# Patient Record
Sex: FEMALE | Race: WHITE
[De-identification: names, ages, dates, MRNs, and addresses within clinical notes are randomized per-mention and may not be internally consistent; named-entity substitution may affect disease eponyms.]

---

## 2021-11-28 ENCOUNTER — HOSPITAL ENCOUNTER (EMERGENCY)
Dept: HOSPITAL 75 - ER FS | Age: 26
Discharge: HOME | End: 2021-11-28
Payer: SELF-PAY

## 2021-11-28 VITALS — WEIGHT: 247.8 LBS | BODY MASS INDEX: 43.91 KG/M2 | HEIGHT: 62.99 IN

## 2021-11-28 VITALS — DIASTOLIC BLOOD PRESSURE: 78 MMHG | SYSTOLIC BLOOD PRESSURE: 130 MMHG

## 2021-11-28 DIAGNOSIS — K29.20: Primary | ICD-10-CM

## 2021-11-28 DIAGNOSIS — F17.210: ICD-10-CM

## 2021-11-28 LAB
ALBUMIN SERPL-MCNC: 4.3 GM/DL (ref 3.2–4.5)
ALP SERPL-CCNC: 143 U/L (ref 40–136)
ALT SERPL-CCNC: 26 U/L (ref 0–55)
APTT PPP: YELLOW S
BACTERIA #/AREA URNS HPF: (no result) /HPF
BASOPHILS # BLD AUTO: 0.1 10^3/UL (ref 0–0.1)
BASOPHILS NFR BLD AUTO: 1 % (ref 0–10)
BILIRUB SERPL-MCNC: 0.2 MG/DL (ref 0.1–1)
BILIRUB UR QL STRIP: NEGATIVE
BUN/CREAT SERPL: 15
CALCIUM SERPL-MCNC: 9.2 MG/DL (ref 8.5–10.1)
CHLORIDE SERPL-SCNC: 107 MMOL/L (ref 98–107)
CO2 SERPL-SCNC: 25 MMOL/L (ref 21–32)
CREAT SERPL-MCNC: 0.71 MG/DL (ref 0.6–1.3)
EOSINOPHIL # BLD AUTO: 0.2 10^3/UL (ref 0–0.3)
EOSINOPHIL NFR BLD AUTO: 1 % (ref 0–10)
FIBRINOGEN PPP-MCNC: CLEAR MG/DL
GFR SERPLBLD BASED ON 1.73 SQ M-ARVRAT: 99 ML/MIN
GLUCOSE SERPL-MCNC: 102 MG/DL (ref 70–105)
GLUCOSE UR STRIP-MCNC: NEGATIVE MG/DL
HCT VFR BLD CALC: 44 % (ref 35–52)
HGB BLD-MCNC: 14.5 G/DL (ref 11.5–16)
KETONES UR QL STRIP: NEGATIVE
LEUKOCYTE ESTERASE UR QL STRIP: (no result)
LIPASE SERPL-CCNC: 67 U/L (ref 8–78)
LYMPHOCYTES # BLD AUTO: 1.5 X 10^3 (ref 1–4)
LYMPHOCYTES NFR BLD AUTO: 14 % (ref 12–44)
MANUAL DIFFERENTIAL PERFORMED BLD QL: NO
MCH RBC QN AUTO: 28 PG (ref 25–34)
MCHC RBC AUTO-ENTMCNC: 33 G/DL (ref 32–36)
MCV RBC AUTO: 84 FL (ref 80–99)
MONOCYTES # BLD AUTO: 0.5 X 10^3 (ref 0–1)
MONOCYTES NFR BLD AUTO: 4 % (ref 0–12)
NEUTROPHILS # BLD AUTO: 8.7 X 10^3 (ref 1.8–7.8)
NEUTROPHILS NFR BLD AUTO: 80 % (ref 42–75)
NITRITE UR QL STRIP: NEGATIVE
PH UR STRIP: 8.5 [PH] (ref 5–9)
PLATELET # BLD: 399 10^3/UL (ref 130–400)
PMV BLD AUTO: 11.5 FL (ref 9–12.2)
POTASSIUM SERPL-SCNC: 4.6 MMOL/L (ref 3.6–5)
PROT SERPL-MCNC: 7.8 GM/DL (ref 6.4–8.2)
PROT UR QL STRIP: (no result)
RBC #/AREA URNS HPF: (no result) /HPF
SODIUM SERPL-SCNC: 143 MMOL/L (ref 135–145)
SP GR UR STRIP: 1.01 (ref 1.02–1.02)
SQUAMOUS #/AREA URNS HPF: (no result) /HPF
WBC # BLD AUTO: 10.9 10^3/UL (ref 4.3–11)
WBC #/AREA URNS HPF: (no result) /HPF

## 2021-11-28 PROCEDURE — 81000 URINALYSIS NONAUTO W/SCOPE: CPT

## 2021-11-28 PROCEDURE — 80053 COMPREHEN METABOLIC PANEL: CPT

## 2021-11-28 PROCEDURE — 36415 COLL VENOUS BLD VENIPUNCTURE: CPT

## 2021-11-28 PROCEDURE — 85025 COMPLETE CBC W/AUTO DIFF WBC: CPT

## 2021-11-28 PROCEDURE — 87088 URINE BACTERIA CULTURE: CPT

## 2021-11-28 PROCEDURE — 84703 CHORIONIC GONADOTROPIN ASSAY: CPT

## 2021-11-28 PROCEDURE — 83690 ASSAY OF LIPASE: CPT
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
## 2023-01-03 ENCOUNTER — HOSPITAL ENCOUNTER (EMERGENCY)
Dept: HOSPITAL 75 - ER FS | Age: 28
Discharge: HOME | End: 2023-01-03
Payer: COMMERCIAL

## 2023-01-03 VITALS — DIASTOLIC BLOOD PRESSURE: 83 MMHG | SYSTOLIC BLOOD PRESSURE: 150 MMHG

## 2023-01-03 VITALS — WEIGHT: 250.22 LBS | HEIGHT: 62.99 IN | BODY MASS INDEX: 44.34 KG/M2
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
## 2023-01-03 DIAGNOSIS — T31.0: ICD-10-CM

## 2023-01-03 DIAGNOSIS — T23.132A: Primary | ICD-10-CM

## 2023-01-03 DIAGNOSIS — Z28.310: ICD-10-CM
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
## 2023-01-03 PROCEDURE — 99281 EMR DPT VST MAYX REQ PHY/QHP: CPT
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
## 2023-05-17 ENCOUNTER — HOSPITAL ENCOUNTER (EMERGENCY)
Dept: HOSPITAL 75 - ER FS | Age: 28
Discharge: HOME | End: 2023-05-17
Payer: COMMERCIAL

## 2023-05-17 VITALS — SYSTOLIC BLOOD PRESSURE: 125 MMHG | DIASTOLIC BLOOD PRESSURE: 45 MMHG

## 2023-05-17 VITALS — BODY MASS INDEX: 47.15 KG/M2 | WEIGHT: 266.1 LBS | HEIGHT: 62.99 IN

## 2023-05-17 DIAGNOSIS — N39.0: Primary | ICD-10-CM

## 2023-05-17 DIAGNOSIS — Z28.310: ICD-10-CM

## 2023-05-17 DIAGNOSIS — E66.9: ICD-10-CM

## 2023-05-17 LAB
ALBUMIN SERPL-MCNC: 4 GM/DL (ref 3.2–4.5)
ALP SERPL-CCNC: 150 U/L (ref 40–136)
ALT SERPL-CCNC: 15 U/L (ref 0–55)
APTT PPP: YELLOW S
BACTERIA #/AREA URNS HPF: (no result) /HPF
BARBITURATES UR QL: NEGATIVE
BASOPHILS # BLD AUTO: 0.1 10^3/UL (ref 0–0.1)
BASOPHILS NFR BLD AUTO: 1 % (ref 0–10)
BENZODIAZ UR QL SCN: NEGATIVE
BILIRUB SERPL-MCNC: 0.2 MG/DL (ref 0.1–1)
BILIRUB UR QL STRIP: NEGATIVE
BUN/CREAT SERPL: 20
CALCIUM SERPL-MCNC: 9.3 MG/DL (ref 8.5–10.1)
CHLORIDE SERPL-SCNC: 104 MMOL/L (ref 98–107)
CO2 SERPL-SCNC: 23 MMOL/L (ref 21–32)
COCAINE UR QL: NEGATIVE
CREAT SERPL-MCNC: 0.84 MG/DL (ref 0.6–1.3)
EOSINOPHIL # BLD AUTO: 0.5 10^3/UL (ref 0–0.3)
EOSINOPHIL NFR BLD AUTO: 4 % (ref 0–10)
FIBRINOGEN PPP-MCNC: (no result) MG/DL
GFR SERPLBLD BASED ON 1.73 SQ M-ARVRAT: 97 ML/MIN
GLUCOSE SERPL-MCNC: 98 MG/DL (ref 70–105)
GLUCOSE UR STRIP-MCNC: NEGATIVE MG/DL
HCT VFR BLD CALC: 44 % (ref 35–52)
HGB BLD-MCNC: 14.2 G/DL (ref 11.5–16)
KETONES UR QL STRIP: NEGATIVE
LEUKOCYTE ESTERASE UR QL STRIP: (no result)
LIPASE SERPL-CCNC: 51 U/L (ref 8–78)
LYMPHOCYTES # BLD AUTO: 3.4 10^3/UL (ref 1–4)
LYMPHOCYTES NFR BLD AUTO: 27 % (ref 12–44)
MANUAL DIFFERENTIAL PERFORMED BLD QL: NO
MCH RBC QN AUTO: 28 PG (ref 25–34)
MCHC RBC AUTO-ENTMCNC: 33 G/DL (ref 32–36)
MCV RBC AUTO: 85 FL (ref 80–99)
METHADONE UR QL SCN: NEGATIVE
MONOCYTES # BLD AUTO: 0.7 10^3/UL (ref 0–1)
MONOCYTES NFR BLD AUTO: 5 % (ref 0–12)
NEUTROPHILS # BLD AUTO: 8 10^3/UL (ref 1.8–7.8)
NEUTROPHILS NFR BLD AUTO: 63 % (ref 42–75)
NITRITE UR QL STRIP: NEGATIVE
OPIATES UR QL SCN: NEGATIVE
OTHER ELEMENTS URNS MICRO: (no result) /HPF
OXYCODONE UR QL: NEGATIVE
PH UR STRIP: 6 [PH] (ref 5–9)
PLATELET # BLD: 349 10^3/UL (ref 130–400)
PMV BLD AUTO: 11.5 FL (ref 9–12.2)
POTASSIUM SERPL-SCNC: 4 MMOL/L (ref 3.6–5)
PROPOXYPH UR QL: NEGATIVE
PROT SERPL-MCNC: 7.3 GM/DL (ref 6.4–8.2)
PROT UR QL STRIP: NEGATIVE
RBC #/AREA URNS HPF: (no result) /HPF
SODIUM SERPL-SCNC: 139 MMOL/L (ref 135–145)
SP GR UR STRIP: <=1.005 (ref 1.02–1.02)
SQUAMOUS #/AREA URNS HPF: (no result) /HPF
TRICYCLICS UR QL SCN: NEGATIVE
WBC # BLD AUTO: 12.7 10^3/UL (ref 4.3–11)
WBC #/AREA URNS HPF: (no result) /HPF

## 2023-05-17 PROCEDURE — 36415 COLL VENOUS BLD VENIPUNCTURE: CPT

## 2023-05-17 PROCEDURE — 80320 DRUG SCREEN QUANTALCOHOLS: CPT

## 2023-05-17 PROCEDURE — 83690 ASSAY OF LIPASE: CPT

## 2023-05-17 PROCEDURE — 81000 URINALYSIS NONAUTO W/SCOPE: CPT

## 2023-05-17 PROCEDURE — 80306 DRUG TEST PRSMV INSTRMNT: CPT

## 2023-05-17 PROCEDURE — 84703 CHORIONIC GONADOTROPIN ASSAY: CPT

## 2023-05-17 PROCEDURE — 80053 COMPREHEN METABOLIC PANEL: CPT

## 2023-05-17 PROCEDURE — 74177 CT ABD & PELVIS W/CONTRAST: CPT

## 2023-05-17 PROCEDURE — 85025 COMPLETE CBC W/AUTO DIFF WBC: CPT

## 2023-05-17 PROCEDURE — 83605 ASSAY OF LACTIC ACID: CPT

## 2023-05-17 NOTE — DIAGNOSTIC IMAGING REPORT
EXAMINATION: CT abdomen and pelvis with intravenous contrast.



TECHNIQUE: Multiple contiguous axial images were obtained through

the abdomen and pelvis after the uneventful administration of

intravenous contrast. All CT scans use one or more of the

following dose optimizing techniques: automated exposure control,

MA and/or KvP adjustment based on patient size and exam type or

iterative reconstruction. 



HISTORY: generalized abdominal pain



COMPARISON: None available.



FINDINGS: 



Lung bases: Bibasilar dependent atelectasis.



Solid organs: The liver is normal without focal lesion. The

gallbladder is normal. There is no biliary ductal dilation.

Pancreas is normal.  Spleen is normal. Adrenal glands are normal.

The kidneys are normal without hydronephrosis.



Bowel: The stomach and small bowel are normal without

obstruction. The colon is normal. The appendix is normal.



Peritoneum: There is no intraperitoneal free fluid or free air.

No suspicious lymphadenopathy. 



Vasculature: Normal without aneurysm.



Musculoskeletal: No suspicious osseous lesion or compression

fracture.



Pelvis: The uterus and adnexa are normal. The urinary bladder is

normal.



IMPRESSION:



1. No acute abnormality in the abdomen or pelvis.



Dictated by: 



  Dictated on workstation # [id number]

## 2023-05-17 NOTE — ED ABDOMINAL PAIN
General


Stated Complaint:  SHARP ADB PAIN| LEFT SIDE





History of Present Illness


Date Seen by Provider:  May 17, 2023


Time Seen by Provider:  03:08


Initial Comments


27-year-old female with PMH of an ectopic in 2016, is here with complaints of 

mild pain in the left lower quadrant which is radiating to the right side which 

began last night.  Patient states that the pain is intense and comes and goes.  

Denies fever and chills, nausea or vomiting, diarrhea, constipation, dysuria or 

hematuria, chest pain.





Allergies and Home Medications


Allergies


Coded Allergies:  


     No Known Drug Allergies (Unverified , 21)





Patient Home Medication List


Home Medication List Reviewed:  Yes


Ondansetron (Ondansetron Odt) 4 Mg Tab.rapdis, 4 MG PO Q6H PRN for 

NAUSEA/VOMITING


   Prescribed by: SUNG TAYLOR on 21 1526





Review of Systems


Review of Systems


Constitutional:  no symptoms reported


EENTM:  No Symptoms Reported


Respiratory:  No Symptoms Reported


Cardiovascular:  No Symptoms Reported


Gastrointestinal:  Abdominal Pain


Genitourinary:  No Symptoms Reported


Musculoskeletal:  no symptoms reported


Skin:  no symptoms reported


Psychiatric/Neurological:  No Symptoms Reported


Endocrine:  No Symptoms Reported


Hematologic/Lymphatic:  No Symptoms Reported





Past Medical-Social-Family Hx


Immunizations Up To Date


First/Initial COVID19 Vaccinat:  Not currently vaccinated





Seasonal Allergies


Seasonal Allergies:  No





Past Medical History


Surgeries:  No


Respiratory:  No


Cardiac:  No


Neurological:  No


Genitourinary:  No


Gastrointestinal:  No


Musculoskeletal:  No


Endocrine:  No


HEENT:  No


Cancer:  No


Psychosocial:  No


Integumentary:  No


Blood Disorders:  No





Family Medical History


No Pertinent Family Hx





Physical Exam


Vital Signs





Vital Signs - First Documented








 23





 03:00


 


Temp 36.6


 


Pulse 93


 


Resp 20


 


B/P (MAP) 144/89 (107)


 


Pulse Ox 10


 


O2 Delivery Room Air





Capillary Refill :


Height/Weight/BMI


Height: 5'3.50"


Weight: 240lbs. oz. [phone]; 44.00 BMI


Method:Stated


General Appearance:  WD/WN, no apparent distress, obese


HEENT:  PERRL/EOMI


Neck:  full range of motion


Respiratory:  lungs clear, normal breath sounds


Cardiovascular:  regular rate, rhythm, no edema


Gastrointestinal:  normal bowel sounds, soft, no organomegaly, tenderness 

(Palpation demonstrates tenderness in the LLQ, RLQ, RUQ, epigastric regions.)


Extremities:  normal range of motion


Back:  no CVA tenderness


Pelvic:  normal external exam


Neurologic/Psychiatric:  alert, normal mood/affect, oriented x 3


Skin:  normal color





Focused Exam


Lactate Level


23 04:30: Lactic Acid Level 0.75





Lactic Acid Level





Laboratory Tests








Test


 23


04:30


 


Lactic Acid Level


 0.75 MMOL/L


(0.50-2.00)











Progress/Results/Core Measures


Results/Orders


Lab Results





Laboratory Tests








Test


 23


03:06 23


03:10 23


04:30 Range/Units


 


 


Urine Color YELLOW     


 


Urine Clarity SL CLOUDY     


 


Urine pH 6.0    5-9  


 


Urine Specific Gravity <=1.005    1.016-1.022  


 


Urine Protein NEGATIVE    NEGATIVE  


 


Urine Glucose (UA) NEGATIVE    NEGATIVE  


 


Urine Ketones NEGATIVE    NEGATIVE  


 


Urine Nitrite NEGATIVE    NEGATIVE  


 


Urine Bilirubin NEGATIVE    NEGATIVE  


 


Urine Urobilinogen 0.2    < = 1.0  MG/DL


 


Urine Leukocyte Esterase 1+ H   NEGATIVE  


 


Urine RBC (Auto) NEGATIVE    NEGATIVE  


 


Urine RBC RARE     /HPF


 


Urine WBC 0-2     /HPF


 


Urine Squamous Epithelial


Cells 2-5 


 


 


  /HPF





 


Urine Crystals NONE     /LPF


 


Urine Bacteria FEW H    /HPF


 


Urine Casts NONE     /LPF


 


Urine Mucus SMALL H    /LPF


 


Urine Other


 CLUE CELLS


PRESENT 


 


  /HPF





 


Urine Culture Indicated NO     


 


Urine Opiates Screen NEGATIVE    NEGATIVE  


 


Urine Oxycodone Screen NEGATIVE    NEGATIVE  


 


Urine Methadone Screen NEGATIVE    NEGATIVE  


 


Urine Propoxyphene Screen NEGATIVE    NEGATIVE  


 


Urine Barbiturates Screen NEGATIVE    NEGATIVE  


 


Ur Tricyclic Antidepressants


Screen NEGATIVE 


 


 


 NEGATIVE  





 


Urine Phencyclidine Screen NEGATIVE    NEGATIVE  


 


Urine Amphetamines Screen NEGATIVE    NEGATIVE  


 


Urine Methamphetamines Screen NEGATIVE    NEGATIVE  


 


Urine Benzodiazepines Screen NEGATIVE    NEGATIVE  


 


Urine Cocaine Screen NEGATIVE    NEGATIVE  


 


Urine Cannabinoids Screen NEGATIVE    NEGATIVE  


 


White Blood Count


 


 12.7 H


 


 4.3-11.0


10^3/uL


 


Red Blood Count


 


 5.10 


 


 3.80-5.11


10^6/uL


 


Hemoglobin  14.2   11.5-16.0  g/dL


 


Hematocrit  44   35-52  %


 


Mean Corpuscular Volume  85   80-99  fL


 


Mean Corpuscular Hemoglobin  28   25-34  pg


 


Mean Corpuscular Hemoglobin


Concent 


 33 


 


 32-36  g/dL





 


Red Cell Distribution Width  13.7   10.0-14.5  %


 


Platelet Count


 


 349 


 


 130-400


10^3/uL


 


Mean Platelet Volume  11.5   9.0-12.2  fL


 


Immature Granulocyte % (Auto)  0    %


 


Neutrophils (%) (Auto)  63   42-75  %


 


Lymphocytes (%) (Auto)  27   12-44  %


 


Monocytes (%) (Auto)  5   0-12  %


 


Eosinophils (%) (Auto)  4   0-10  %


 


Basophils (%) (Auto)  1   0-10  %


 


Neutrophils # (Auto)


 


 8.0 H


 


 1.8-7.8


10^3/uL


 


Lymphocytes # (Auto)


 


 3.4 


 


 1.0-4.0


10^3/uL


 


Monocytes # (Auto)


 


 0.7 


 


 0.0-1.0


10^3/uL


 


Eosinophils # (Auto)


 


 0.5 H


 


 0.0-0.3


10^3/uL


 


Basophils # (Auto)


 


 0.1 


 


 0.0-0.1


10^3/uL


 


Immature Granulocyte # (Auto)


 


 0.1 


 


 0.0-0.1


10^3/uL


 


Sodium Level  139   135-145  MMOL/L


 


Potassium Level  4.0   3.6-5.0  MMOL/L


 


Chloride Level  104     MMOL/L


 


Carbon Dioxide Level  23   21-32  MMOL/L


 


Anion Gap  12   5-14  MMOL/L


 


Blood Urea Nitrogen  17   7-18  MG/DL


 


Creatinine


 


 0.84 


 


 0.60-1.30


MG/DL


 


Estimat Glomerular Filtration


Rate 


 97 


 


  





 


BUN/Creatinine Ratio  20    


 


Glucose Level  98     MG/DL


 


Calcium Level  9.3   8.5-10.1  MG/DL


 


Corrected Calcium  9.3   8.5-10.1  MG/DL


 


Total Bilirubin  0.2   0.1-1.0  MG/DL


 


Aspartate Amino Transf


(AST/SGOT) 


 17 


 


 5-34  U/L





 


Alanine Aminotransferase


(ALT/SGPT) 


 15 


 


 0-55  U/L





 


Alkaline Phosphatase  150 H    U/L


 


Total Protein  7.3   6.4-8.2  GM/DL


 


Albumin  4.0   3.2-4.5  GM/DL


 


Lipase  51   8-78  U/L


 


Serum Alcohol  < 10   <10  MG/DL


 


Lactic Acid Level


 


 


 0.75 


 0.50-2.00


MMOL/L








My Orders





Orders - TYSHAWN WALLER MD


Alcohol (23 03:14)


Cbc With Automated Diff (23 03:14)


Comprehensive Metabolic Panel (23 03:14)


Drug Screen Stat (Urine) (23 03:14)


Lactic Acid Analyzer (23 03:14)


Lipase (23 03:14)


Ua Culture If Indicated (23 03:14)


Ct Abdomen/Pelvis W (23 03:22)


Ketorolac Injection (Toradol Injection) (23 03:30)


Ed Iv/Invasive Line Start (23 03:23)


Ns Iv 1000 Ml (Sodium Chloride 0.9%) (23 03:23)


Iohexol Injection (Omnipaque 350 Mg/Ml 1 (23 04:15)


Received Contrast (Hold Metformin- Contr (23 04:15)


Sodium Chloride Flush (Catheter Flush Sy (23 04:15)


Ns (Ivpb) (Sodium Chloride 0.9% Ivpb Bag (23 04:15)





Medications Given in ED





Current Medications








 Medications  Dose


 Ordered  Sig/Logan


 Route  Start Time


 Stop Time Status Last Admin


Dose Admin


 


 Iohexol  100 ml  ONCE  ONCE


 IV  23 04:15


 23 04:16 DC 23 04:34


80 ML


 


 Ketorolac


 Tromethamine  15 mg  ONCE  ONCE


 IVP  23 03:30


 23 03:31 DC 23 03:34


15 MG


 


 Sodium Chloride  10 ml  AS NEEDED  PRN


 IV  23 04:15


    23 04:34


10 ML


 


 Sodium Chloride  100 ml  ONCE  ONCE


 IV  23 04:15


 23 04:16 DC 23 04:34


100 ML








Vital Signs/I&O











 23





 03:00 03:34


 


Temp 36.6 36.6


 


Pulse 93 


 


Resp 20 


 


B/P (MAP) 144/89 (107) 


 


Pulse Ox 10 


 


O2 Delivery Room Air 











Progress


Progress Note :  


Progress Note


1. ABDOMINAL PAIN/ UTI:


- CT ABD & PELVIS: normal


- CBC/ CMP: no acute findings


- Lipase negative


- UDS: negative


- UA is positive for LE and bacteria


- NS IVF


- Toradol 15mg iv STAT


- Prescription given for Nitrofurantoin 100mg bid for 7 days


-Adequate hydration advised


-Follow-up with PCP as needed


-The patient was seen in the ED, and treated appropriately to presentation at a 

specific point in time. Patient is informed that there is a possibility that 

disease and illness can evolve and change in acuity rapidly or slowly after 

patient is discharged from the ER. Precautionary advice given to the patient for

immediate return to ER if symptoms worsen or do not resolve, and to seek 

emergency care sooner rather than later. Pt also advised on the importance of 

PCP follow up and compliance with management and follow up plan with PCP and/or 

specialist, as this is part of the management plan. Pt verbally expressed 

understanding.





Diagnostic Imaging





   Diagonstic Imaging:  CT


   Plain Films/CT/US/NM/MRI:  abdomen, pelvis


Comments


                 [facility].


                                [place], [place]





NAME:   STEF COOL


MED REC#:   [id number]


ACCOUNT#:   [id number]


PT STATUS:   REG ER


:   1995


PHYSICIAN:   TYSHAWN WALLER MD


ADMIT DATE:   23/ER FS


                                   ***Draft***


Date of Exam:23





CT ABDOMEN/PELVIS W








EXAMINATION: CT abdomen and pelvis with intravenous contrast.





TECHNIQUE: Multiple contiguous axial images were obtained through


the abdomen and pelvis after the uneventful administration of


intravenous contrast. All CT scans use one or more of the


following dose optimizing techniques: automated exposure control,


MA and/or KvP adjustment based on patient size and exam type or


iterative reconstruction. 





HISTORY: generalized abdominal pain





COMPARISON: None available.





FINDINGS: 





Lung bases: Bibasilar dependent atelectasis.





Solid organs: The liver is normal without focal lesion. The


gallbladder is normal. There is no biliary ductal dilation.


Pancreas is normal.  Spleen is normal. Adrenal glands are normal.


The kidneys are normal without hydronephrosis.





Bowel: The stomach and small bowel are normal without


obstruction. The colon is normal. The appendix is normal.





Peritoneum: There is no intraperitoneal free fluid or free air.


No suspicious lymphadenopathy. 





Vasculature: Normal without aneurysm.





Musculoskeletal: No suspicious osseous lesion or compression


fracture.





Pelvis: The uterus and adnexa are normal. The urinary bladder is


normal.





IMPRESSION:





1. No acute abnormality in the abdomen or pelvis.





  Dictated on workstation # [id number]








Dict:   23


Trans:   23 [id number]


 [id number]





Interpreted by:     EZIO CHAKRABORTY DO


Electronically signed by:





Departure


Impression





   Primary Impression:  


   UTI (urinary tract infection)


Disposition:   HOME, SELF-CARE


Condition:  Stable





Departure-Patient Inst.


Referrals:  


NO,LOCAL PHYSICIAN (PCP/Family)


Primary Care Physician


Patient Instructions:  Urinary Tract Infections in Adults, Urinary Tract 

Infection, Adult ED





Add. Discharge Instructions:  


- Prescription given for Nitrofurantoin 100mg bid for 7 days


-Adequate hydration advised


-Follow-up with PCP as needed


Scripts


Nitrofurantoin Macrocrystal (Nitrofurantoin) 100 Mg Capsule


100 MG PO BID for 7 Days, #14 CAP


   Prov: TYSHAWN WALLER MD         23


Work/School Note:  Work Release Form   Date Seen in the Emergency Department:  

May 17, 2023


   Return to Work:  May 18, 2023


   Restrictions:  No Restrictions











TYSHAWN WALLER MD              May 17, 2023 03:14